# Patient Record
Sex: FEMALE
[De-identification: names, ages, dates, MRNs, and addresses within clinical notes are randomized per-mention and may not be internally consistent; named-entity substitution may affect disease eponyms.]

---

## 2020-02-06 ENCOUNTER — TRANSCRIPTION ENCOUNTER (OUTPATIENT)
Age: 55
End: 2020-02-06

## 2021-10-21 ENCOUNTER — TRANSCRIPTION ENCOUNTER (OUTPATIENT)
Age: 56
End: 2021-10-21

## 2022-06-15 ENCOUNTER — APPOINTMENT (OUTPATIENT)
Dept: ORTHOPEDIC SURGERY | Facility: CLINIC | Age: 57
End: 2022-06-15
Payer: COMMERCIAL

## 2022-06-15 VITALS — WEIGHT: 250 LBS | BODY MASS INDEX: 47.2 KG/M2 | HEIGHT: 61 IN

## 2022-06-15 DIAGNOSIS — E66.01 MORBID (SEVERE) OBESITY DUE TO EXCESS CALORIES: ICD-10-CM

## 2022-06-15 DIAGNOSIS — S76.212D STRAIN OF ADDUCTOR MUSCLE, FASCIA AND TENDON OF LEFT THIGH, SUBSEQUENT ENCOUNTER: ICD-10-CM

## 2022-06-15 DIAGNOSIS — Z00.00 ENCOUNTER FOR GENERAL ADULT MEDICAL EXAMINATION W/OUT ABNORMAL FINDINGS: ICD-10-CM

## 2022-06-15 PROCEDURE — 99213 OFFICE O/P EST LOW 20 MIN: CPT

## 2022-06-15 PROCEDURE — 72100 X-RAY EXAM L-S SPINE 2/3 VWS: CPT

## 2022-06-15 RX ORDER — LAMOTRIGINE 25 MG/1
25 TABLET, CHEWABLE ORAL
Refills: 0 | Status: ACTIVE | COMMUNITY

## 2022-06-15 RX ORDER — ESOMEPRAZOLE MAGNESIUM 40 MG/1
40 CAPSULE, DELAYED RELEASE ORAL
Refills: 0 | Status: ACTIVE | COMMUNITY

## 2022-06-15 RX ORDER — ROSUVASTATIN CALCIUM 20 MG/1
20 TABLET, FILM COATED ORAL
Refills: 0 | Status: ACTIVE | COMMUNITY

## 2022-06-15 RX ORDER — SERTRALINE HYDROCHLORIDE 100 MG/1
100 TABLET, FILM COATED ORAL
Refills: 0 | Status: ACTIVE | COMMUNITY

## 2022-06-15 RX ORDER — FUROSEMIDE 20 MG/1
20 TABLET ORAL
Refills: 0 | Status: ACTIVE | COMMUNITY

## 2022-06-15 RX ORDER — BUPROPION HYDROCHLORIDE 150 MG/1
150 TABLET, FILM COATED, EXTENDED RELEASE ORAL
Refills: 0 | Status: ACTIVE | COMMUNITY

## 2022-06-15 NOTE — ASSESSMENT
[FreeTextEntry1] : 3 MONTHS OF GROIN AND INNER THIGH PAIN WITH ADVANCED HIP OA\par PT AND WEIGHT LOSS ADVISED\par F/U WITH OUR HIP TEAM ADVISED, BEYOND MY SCOPE

## 2022-06-15 NOTE — HISTORY OF PRESENT ILLNESS
[5] : 5 [1] : 2 [Sharp] : sharp [Rest] : rest [Ice] : ice [Walking] : walking [Stairs] : stairs [de-identified] : 06/15/2022:  Ms. BERMUDEZ IS A 56 year YEAR OLD female PRESENTS TODAY FOR LEFT THIGH. INTERMITTENT PAIN SINCE 3/2022. WORSENED 4/2022 WITHOUT INJURY. WORSE GETTING UP FROM SEATED POSITION. PAIN IS ANTERIOR AND GROIN. NO N/T. NO LBP. [] : no [FreeTextEntry1] : left thigh  [FreeTextEntry3] : 03/2022 [FreeTextEntry5] : No  known cause of injury. Pain since 03/2022, worsened 04/2022.   [FreeTextEntry6] : pulling [FreeTextEntry9] : compression [de-identified] : getting in and out of car

## 2022-08-03 ENCOUNTER — APPOINTMENT (OUTPATIENT)
Dept: ORTHOPEDIC SURGERY | Facility: CLINIC | Age: 57
End: 2022-08-03

## 2022-08-03 VITALS — HEIGHT: 61 IN | BODY MASS INDEX: 47.2 KG/M2 | WEIGHT: 250 LBS

## 2022-08-03 DIAGNOSIS — E78.00 PURE HYPERCHOLESTEROLEMIA, UNSPECIFIED: ICD-10-CM

## 2022-08-03 DIAGNOSIS — Z86.59 PERSONAL HISTORY OF OTHER MENTAL AND BEHAVIORAL DISORDERS: ICD-10-CM

## 2022-08-03 PROCEDURE — 99213 OFFICE O/P EST LOW 20 MIN: CPT

## 2022-08-03 PROCEDURE — 99214 OFFICE O/P EST MOD 30 MIN: CPT

## 2022-08-03 NOTE — HISTORY OF PRESENT ILLNESS
[9] : 9 [7] : 7 [Radiating] : radiating [Sharp] : sharp [Part time] : Work status: part time [de-identified] : 57yo F with L hip/groin pain since 02/2022 with no injury. Has tried OTC NSAIDs to questionable relief. She has been treated by Dr. Parker initially with PT since 6/15/22 that has been beneficial to her pain and ROM. Pain is better today. Admits to increasing pain and difficulty with prolonged walking and stairs.  [] : Post Surgical Visit: no [FreeTextEntry1] : L Hip [FreeTextEntry3] : 2/2022 [FreeTextEntry5] : 55 Y/O RHD F eval L Hip NKI Pain ongoing since Feb 2022 Pt states prior TX of Physical therapy pt states pain is located in the Medial Groin L side  [FreeTextEntry7] : Down the  L eg  [de-identified] : None

## 2022-08-03 NOTE — DISCUSSION/SUMMARY
[de-identified] : The natural progression of Osteoarthritis was explained to the patient.  We discussed the possible treatment options from conservative to operative.  These included NSAIDs, Glucosamine and Chondrotin sulfate, and Physical Therapy as well different types of injections.  We also discussed that at some point they may progress to needed a ANNALISE.  Information and pamphlets were given when appropriate. \par \par Patient is being referred to Physical Therapy and home exercise program. \par \par Progress note completed by Denisha Conway PA-C.\par The documentation recorded by the PA accurately reflects the service I personally performed and the decisions made by me. -Dr. Roth

## 2022-08-03 NOTE — ASSESSMENT
[FreeTextEntry1] : 56F with Adv L hip OA\par \par Discussed anti-inflammatories, PT/HEP, IA hip CSI, and briefly ANNALISE. She does not feel the need for more invasive treatments at this point. She has benefited from PT. she is well informed and would like to proceed with continuing PT. Encouraged weight loss. She will follow up prn if symptoms fail to improve or worsen

## 2023-01-18 ENCOUNTER — APPOINTMENT (OUTPATIENT)
Dept: ORTHOPEDIC SURGERY | Facility: CLINIC | Age: 58
End: 2023-01-18
Payer: COMMERCIAL

## 2023-01-18 VITALS — HEIGHT: 61 IN | BODY MASS INDEX: 47.2 KG/M2 | WEIGHT: 250 LBS

## 2023-01-18 PROCEDURE — 99214 OFFICE O/P EST MOD 30 MIN: CPT

## 2023-01-18 PROCEDURE — 99213 OFFICE O/P EST LOW 20 MIN: CPT

## 2023-01-18 RX ORDER — METHYLPREDNISOLONE 4 MG/1
4 TABLET ORAL
Qty: 1 | Refills: 0 | Status: ACTIVE | COMMUNITY
Start: 2023-01-18 | End: 1900-01-01

## 2023-01-18 NOTE — IMAGING
[de-identified] : Spine:\par Inspection/Palpation:\par No tenderness to palpation throughout Cervical/thoracic/lumbar spine.\par No bony stepoffs, No lesions.\par \par Gait:\par Non-antalgic, able to perform bilateral toe and heel rise.  Able to perform tandem gait.  \par \par Range of Motion:\par Lumbar Spine: Flexion to 90 degrees, Extension to 30 degrees, rotation 30 degrees bilaterally, lateral flexion to 30 degrees bilaterally.\par \par Neurologic:\par \par Bilateral Lower Extremities 5/5 Iliopsoas/Quadriceps/Hamstrings/ Tibialis Anterior/ Gastrocnemius. Extensor Hallucis Longus/ Flexor Hallucis Longus except \par \par \par Sensation intact to light touch L2-S1\par \par Patellar/ Achilles reflex within normal limits.\par \par \par Negative straight leg raise\par \par No pain with IR/ER/Flexion of HIps bilaterally \par \par X-ray Ap/Lateral of lumbar spine from were viewed and interpreted.  Normal alignment is maintained without any spondylolisthesis. L4-5 disc height loss. transitional anatomy.

## 2023-01-18 NOTE — ASSESSMENT
[FreeTextEntry1] : 57 F With LLE radicular pain failed PT since novemebr\par MRI L spine to  rule out H NP\par FU After MRI MDP

## 2023-01-18 NOTE — HISTORY OF PRESENT ILLNESS
[Lower back] : lower back [8] : 8 [2] : 2 [Household chores] : household chores [Leisure] : leisure [Sleep] : sleep [Standing] : standing [Walking] : walking [Bending forward] : bending forward [Stairs] : stairs [Lying in bed] : lying in bed [de-identified] : 1/18/2023: 57 F with months o flow back pain.  Whenever she goes to stand up she has pain in left thigh.  Used to be more anterior now moving to more lateral thigh.,  Began possibly in march.  No specific injury.  Was seeing Dr. Roth for left hip pain.  HGas been doing PT since november.  Have been working on her low back.   as well.  Pain used to be in groin but now in latearl thigh.   [] : no [FreeTextEntry5] : 56yo female presenting with lower back pain with no know injury or fall. She states she has been doing pt has been doing PT for hips 1x/wk for about 2 months, they told her they believe it is more lower back pain and referred her to Dr. Haider [de-identified] : getting up from chair, getting in and out of the car [de-identified] : xray

## 2023-02-01 ENCOUNTER — APPOINTMENT (OUTPATIENT)
Dept: ORTHOPEDIC SURGERY | Facility: CLINIC | Age: 58
End: 2023-02-01

## 2023-02-05 ENCOUNTER — RESULT REVIEW (OUTPATIENT)
Age: 58
End: 2023-02-05

## 2023-02-15 ENCOUNTER — APPOINTMENT (OUTPATIENT)
Dept: ORTHOPEDIC SURGERY | Facility: CLINIC | Age: 58
End: 2023-02-15
Payer: COMMERCIAL

## 2023-02-15 VITALS — BODY MASS INDEX: 47.2 KG/M2 | HEIGHT: 61 IN | WEIGHT: 250 LBS

## 2023-02-15 PROCEDURE — 99213 OFFICE O/P EST LOW 20 MIN: CPT

## 2023-02-15 NOTE — IMAGING
[de-identified] : Spine:\par Inspection/Palpation:\par No tenderness to palpation throughout Cervical/thoracic/lumbar spine.\par No bony stepoffs, No lesions.\par \par Gait:\par Non-antalgic, able to perform bilateral toe and heel rise.  Able to perform tandem gait.  \par \par Range of Motion:\par Lumbar Spine: Flexion to 90 degrees, Extension to 30 degrees, rotation 30 degrees bilaterally, lateral flexion to 30 degrees bilaterally.\par \par Neurologic:\par \par Bilateral Lower Extremities 5/5 Iliopsoas/Quadriceps/Hamstrings/ Tibialis Anterior/ Gastrocnemius. Extensor Hallucis Longus/ Flexor Hallucis Longus except \par \par \par Sensation intact to light touch L2-S1\par \par Patellar/ Achilles reflex within normal limits.\par \par \par Negative straight leg raise\par \par No pain with IR/ER/Flexion of HIps bilaterally \par \par X-ray Ap/Lateral of lumbar spine from were viewed and interpreted.  Normal alignment is maintained without any spondylolisthesis. L4-5 disc height loss. transitional anatomy. \par \par MRI L spine \par \par T12-L1: There is a minimal broad-based posterior disc protrusion. There is no\par neural foraminal narrowing. There is no spinal canal stenosis.\par L1-L2: No disc herniation, central canal, or foraminal stenosis.\par L2-L3: No disc herniation, central canal, or foraminal stenosis.\par L3-L4: No disc herniation, central canal, or foraminal stenosis.\par L4-L5: No disc herniation, central canal, or foraminal stenosis. There is\par bilateral facet joint arthrosis.\par L5-S1: There is spinal canal stenosis measuring 9 mm in AP dimension. There is\par bilateral facet joint arthrosis. There is no neural foraminal narrowing.\par \par There is a transitional lumbosacral vertebra with partial lumbarization of the\par S1 vertebral body.\par \par IMPRESSION:\par \par Multilevel degenerative disc disease with spinal canal stenosis as detailed\par above.\par ----- Page Break -----\par Transitional lumbosacral vertebra with partial lumbarization of the S1 vertebral\par body.\par

## 2023-02-15 NOTE — ASSESSMENT
[FreeTextEntry1] : 57 F With LLE radicular pain failed PT since november Has some degenerative Changes and mild spinal canal stensois. \par Pain management \par FU with me after injections \par NO surgical intervention

## 2023-02-15 NOTE — HISTORY OF PRESENT ILLNESS
[Lower back] : lower back [8] : 8 [2] : 2 [Household chores] : household chores [Leisure] : leisure [Sleep] : sleep [Standing] : standing [Walking] : walking [Bending forward] : bending forward [Stairs] : stairs [Lying in bed] : lying in bed [de-identified] : 1/18/2023: 57 F with months o flow back pain.  Whenever she goes to stand up she has pain in left thigh.  Used to be more anterior now moving to more lateral thigh.,  Began possibly in march.  No specific injury.  Was seeing Dr. Roth for left hip pain.  HGas been doing PT since november.  Have been working on her low back.   as well.  Pain used to be in groin but now in latearl thigh. \par \par \par 2/15/23: pt is here for MRI results of the lower back. states some relief from MDP.  Pain is still wolrse when standing.   [] : no [FreeTextEntry5] : 56yo female presenting with lower back pain with no know injury or fall. She states she has been doing pt has been doing PT for hips 1x/wk for about 2 months, they told her they believe it is more lower back pain and referred her to Dr. Haider [de-identified] : getting up from chair, getting in and out of the car [de-identified] : xray

## 2023-02-20 ENCOUNTER — APPOINTMENT (OUTPATIENT)
Dept: PAIN MANAGEMENT | Facility: CLINIC | Age: 58
End: 2023-02-20
Payer: COMMERCIAL

## 2023-02-20 VITALS — HEIGHT: 61 IN | BODY MASS INDEX: 47.2 KG/M2 | WEIGHT: 250 LBS

## 2023-02-20 DIAGNOSIS — M54.16 RADICULOPATHY, LUMBAR REGION: ICD-10-CM

## 2023-02-20 PROCEDURE — 99204 OFFICE O/P NEW MOD 45 MIN: CPT

## 2023-02-20 NOTE — DISCUSSION/SUMMARY
[de-identified] : After discussing various treatment options with the patient including but not limited to oral medications, physical therapy, exercise modalities as well as interventional spinal injections, we have decided with the following plan:\par \par - Continue Home exercises, stretching, activity modification, physical therapy, and conservative care.\par - Recommend LEFT hip injection under fluoroscopic guidance with image.\par - The risks, benefits and alternatives of the proposed procedure were explained in detail with the patient. The risks outlined include but are not limited to infection, bleeding, nerve injury, a temporary increase in pain, failure to resolve symptoms, allergic reaction, symptom recurrence, and possible elevation of blood sugar in diabetics. All questions were answered to patient's apparent satisfaction and he/she verbalized an understanding.\par - The pain has not responded to conservative care including NSAID therapy and/or physical therapy.  There is no bleeding tendency, unstable medical condition, or systemic infection.\par - Follow up in 1-2 weeks post injection for re-evaluation.\par

## 2023-02-20 NOTE — HISTORY OF PRESENT ILLNESS
[4] : 4 [8] : 8 [Radiating] : radiating [Sharp] : sharp [Rest] : rest [Meds] : meds [Physical therapy] : physical therapy [] : no [FreeTextEntry1] : left side of the lower back  [de-identified] : getting up from sitting and laying  position.

## 2023-02-20 NOTE — PHYSICAL EXAM
[de-identified] : Constitutional; Appears well, no apparent distress\par Ability to communicate: Normal \par Respiratory: non-labored breathing\par Skin: No rash noted\par Head: Normocephalic, atraumatic\par Neck: no visible thyroid enlargement\par Eyes: Extraocular movements intact\par Neurologic: Alert and oriented x3\par Psychiatric: normal mood, affect and behavior  [Left] : left hip [] : mildly antalgic

## 2023-03-13 ENCOUNTER — APPOINTMENT (OUTPATIENT)
Dept: PAIN MANAGEMENT | Facility: CLINIC | Age: 58
End: 2023-03-13
Payer: COMMERCIAL

## 2023-03-13 DIAGNOSIS — M16.12 UNILATERAL PRIMARY OSTEOARTHRITIS, LEFT HIP: ICD-10-CM

## 2023-03-13 PROCEDURE — 77002 NEEDLE LOCALIZATION BY XRAY: CPT | Mod: 59

## 2023-03-13 PROCEDURE — J3490M: CUSTOM

## 2023-03-13 PROCEDURE — 73525 CONTRAST X-RAY OF HIP: CPT | Mod: 26,59

## 2023-03-13 PROCEDURE — 27093 INJECTION FOR HIP X-RAY: CPT | Mod: LT

## 2023-03-13 NOTE — PROCEDURE
[FreeTextEntry3] : Date of Service: 03/13/2023 \par \par Account: [id number]\par \par Patient: JUANITA BERMUDEZ \par \par YOB: 1965\par \par Age: 57 year\par \par \par Surgeon:                                                          Iftikhar Haider D.O. \par \par Pre-Operative Diagnosis:                              Unilateral primary osteoarthritis, left hip\par \par Post-Operative Diagnosis:                            Same\par \par Procedure:                                                       Left Hip arthrogram and steroid injection under fluoroscopic guidance                                              \par \par \par This procedure was carried out using fluoroscopic guidance.  The risks and benefits of the procedure were discussed extensively with the patient.  The consent of the patient was obtained and the following procedure was performed.\par \par The patient was placed in the supine position with left hip flexed and externally rotated 25 degrees.  The area of the left groin was prepped and draped in a sterile fashion. A timeout was performed with all essential staff present and the site and side were verified. The fluoroscopic image intensifier was then positioned so that the left hip appeared in view, and the midline intertrochanteric region was identified and marked. The skin and subcutaneous structures were then anesthetized using 1 cc of 1% lidocaine.  A 22-gauge spinal needle was then inserted and directed into this left hip intra-capsular region.  After negative aspiration for heme and CSF, 3 cc of Omnipaque contrast was injected under live fluoroscopy and appeared to fill the joint margins. \par \par Left hip arthrogram showed no intravascular flow, and good spread around the femoral head and to the acetabulum. An injectate of 3 cc 0.25% bupivacaine plus 6 mg of betamethasone was then injected into the left hip space.\par \par The needle was then removed and pressure was applied.  Anesthesia personnel were present throughout the procedure. The patient was instructed to apply ice over the injection site for twenty minutes every two hours for the next 24 to 48 hours.  The patient was also instructed to contact me immediately if there were any problems.\par \par Iftikhar Haider D.O.\par

## 2023-03-27 ENCOUNTER — APPOINTMENT (OUTPATIENT)
Dept: PAIN MANAGEMENT | Facility: CLINIC | Age: 58
End: 2023-03-27

## 2023-04-03 ENCOUNTER — APPOINTMENT (OUTPATIENT)
Dept: PAIN MANAGEMENT | Facility: CLINIC | Age: 58
End: 2023-04-03

## 2023-12-23 ENCOUNTER — NON-APPOINTMENT (OUTPATIENT)
Age: 58
End: 2023-12-23